# Patient Record
Sex: FEMALE | NOT HISPANIC OR LATINO | Employment: UNEMPLOYED | URBAN - METROPOLITAN AREA
[De-identification: names, ages, dates, MRNs, and addresses within clinical notes are randomized per-mention and may not be internally consistent; named-entity substitution may affect disease eponyms.]

---

## 2022-03-06 PROBLEM — Z01.419 WELL WOMAN EXAM: Status: ACTIVE | Noted: 2022-03-06

## 2022-03-10 ENCOUNTER — TELEPHONE (OUTPATIENT)
Dept: OBSTETRICS AND GYNECOLOGY | Facility: CLINIC | Age: 22
End: 2022-03-10

## 2022-03-10 ENCOUNTER — LAB (OUTPATIENT)
Dept: LAB | Facility: HOSPITAL | Age: 22
End: 2022-03-10

## 2022-03-10 ENCOUNTER — OFFICE VISIT (OUTPATIENT)
Dept: OBSTETRICS AND GYNECOLOGY | Facility: CLINIC | Age: 22
End: 2022-03-10

## 2022-03-10 VITALS
WEIGHT: 138.8 LBS | SYSTOLIC BLOOD PRESSURE: 100 MMHG | DIASTOLIC BLOOD PRESSURE: 60 MMHG | BODY MASS INDEX: 21.79 KG/M2 | HEIGHT: 67 IN

## 2022-03-10 DIAGNOSIS — Z11.8 SCREENING FOR CHLAMYDIAL DISEASE: ICD-10-CM

## 2022-03-10 DIAGNOSIS — N92.6 IRREGULAR MENSES: Primary | ICD-10-CM

## 2022-03-10 PROBLEM — F41.9 ANXIETY: Status: ACTIVE | Noted: 2020-01-01

## 2022-03-10 PROCEDURE — 87591 N.GONORRHOEAE DNA AMP PROB: CPT

## 2022-03-10 PROCEDURE — 99203 OFFICE O/P NEW LOW 30 MIN: CPT | Performed by: OBSTETRICS & GYNECOLOGY

## 2022-03-10 PROCEDURE — 87491 CHLMYD TRACH DNA AMP PROBE: CPT

## 2022-03-10 RX ORDER — BUSPIRONE HYDROCHLORIDE 5 MG/1
5 TABLET ORAL 2 TIMES DAILY
COMMUNITY
Start: 2022-02-24

## 2022-03-10 RX ORDER — ESTRADIOL 1 MG/1
TABLET ORAL
Qty: 21 TABLET | Refills: 0 | Status: SHIPPED | OUTPATIENT
Start: 2022-03-10

## 2022-03-10 RX ORDER — NORETHINDRONE AND ETHINYL ESTRADIOL 0.5-0.035
1 KIT ORAL DAILY
Qty: 28 TABLET | Refills: 12 | Status: SHIPPED | OUTPATIENT
Start: 2022-03-10 | End: 2023-03-10

## 2022-03-10 RX ORDER — LEVONORGESTREL AND ETHINYL ESTRADIOL 90; 20 UG/1; UG/1
TABLET ORAL
COMMUNITY
Start: 2022-02-04 | End: 2022-03-10 | Stop reason: SINTOL

## 2022-03-10 NOTE — TELEPHONE ENCOUNTER
Pharmacy calling they don't have the birth control called in - can they switch to another brand -mars call [phone] and advise

## 2022-03-10 NOTE — PROGRESS NOTES
"Subjective   Chief Complaint   Patient presents with   • Menstrual Problem     Heavy  periods and breakthrough bleeding with current birth control    • Contraception     Consult to possibly switch birth control         Chastity Bran is a 21 y.o. year old .  No LMP recorded.  She comes today to talk about issues with her cycles.  She is on birth control for about 3 years time now.  She initially was on a standard pill that gave her a cycle monthly.  Because she hated having her menses she was changed to an extended cycle pill.  She been having problems with bleeding for quite some time now.  She is red that stopping the pill periodically will allow the cycles to reset.  She is on multiple occasion stop the pill for 3 or 4 days at a time but it really has not solved her problem.    She is sexually active.  They are monogamous.  Condoms are not routinely used.    The following portions of the patient's history were reviewed and updated as appropriate:current medications, allergies, past family history, past medical history, past social history and past surgical history    Social History    Tobacco Use      Smoking status: Never Smoker      Smokeless tobacco: Never Used         Objective   /60   Ht 170.2 cm (67\")   Wt 63 kg (138 lb 12.8 oz)   BMI 21.74 kg/m²     Lab Review   No data reviewed    Imaging   No data reviewed        Assessment   1. Irregular menses most likely related to progestin predominance on birth control pills     Plan   1. Explained mechanism of action and why adding additional estrogen is helpful  2. Have encouraged her to check a home urine pregnancy test first before beginning the recommended treatment.  3. I think she was served better to get to a once a month predictable cycle for a while.  At that point if things have straightened out I think we can talk about ways to transition to an extended cycle.  4. The following tests were ordered today: STD testing (GC/CHL) by urine.  It " was explained to Chastity that all lab test should be back within the one week after they are performed. She will be notified about the results, regardless of the findings. If she has not been contacted by the office within 2 weeks after the test has been performed, it is her responsibility to contact us to learn about her results.  5. The use of condoms for STD prevention was emphasized.  It was explained to Chastity that condoms are not a full proof way to eliminate the chance of a sexually transmitted disease.  Ultimately knowing her partner's sexual history is most important.  All of her questions related to condom use were answered.  6. The importance of keeping all planned follow-up and taking all medications as prescribed was emphasized.  7. Follow up for recheck of Her cycle in 3 to 4 months time     New Medications Ordered This Visit   Medications   • norethindrone-ethinyl estradiol (Necon 1/35, 28,) 1-35 MG-MCG per tablet     Sig: Take 1 tablet by mouth Daily.     Dispense:  28 tablet     Refill:  4   • estradiol (Estrace) 1 MG tablet     Si pill daily day 1 through 7 monthly for 3 months     Dispense:  21 tablet     Refill:  0          This note was electronically signed.    Shun Caldwell M.D.  March 10, 2022    Total time spent today with Chastity  was 30-44 minutes (level 3) - pathophysiology of her presenting problem along with plans for any diagnositc work-up and treatment.    Part of this note may be an electronic transcription/translation of spoken language to printed text using the Dragon Dictation System.

## 2022-03-12 LAB
C TRACH RRNA SPEC QL NAA+PROBE: NEGATIVE
N GONORRHOEA RRNA SPEC QL NAA+PROBE: NEGATIVE

## 2024-05-03 ENCOUNTER — OFFICE VISIT (OUTPATIENT)
Dept: OBSTETRICS AND GYNECOLOGY | Facility: CLINIC | Age: 24
End: 2024-05-03
Payer: COMMERCIAL

## 2024-05-03 VITALS
WEIGHT: 144.6 LBS | BODY MASS INDEX: 22.7 KG/M2 | SYSTOLIC BLOOD PRESSURE: 110 MMHG | HEIGHT: 67 IN | DIASTOLIC BLOOD PRESSURE: 64 MMHG

## 2024-05-03 DIAGNOSIS — Z01.419 WELL WOMAN EXAM WITH ROUTINE GYNECOLOGICAL EXAM: Primary | ICD-10-CM

## 2024-05-03 RX ORDER — HYDROXYZINE PAMOATE 25 MG/1
25 CAPSULE ORAL
COMMUNITY
Start: 2024-04-02

## 2024-05-03 RX ORDER — LISDEXAMFETAMINE DIMESYLATE 30 MG/1
30 CAPSULE ORAL EVERY MORNING
COMMUNITY
Start: 2024-04-01

## 2024-05-03 NOTE — PROGRESS NOTES
Subjective   Chief Complaint   Patient presents with    Gynecologic Exam     Well woman exam     Chastity Bran is a 23 y.o. year old  presenting to be seen for her annual exam.   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   Today is her first pap. She is currently menstruating.  SEXUAL Hx:  She is not currently sexually active.  In the past year there there has been NO new sexual partners.    Condoms are not needed because she is not sexually active.  She would not like to be screened for STD's at today's exam.  Current birth control method: abstinence.  She is happy with her current method of contraception and does not want to discuss alternative methods of contraception. She is considering restarting ocps, she had continued breakthrough bleeding with previous pill, if she restarts pills she would like to try a different one.   MENSTRUAL Hx:  Patient's last menstrual period was 2024 (exact date).  In the past 6 months her cycles have been regular, predictable and occur monthly.  Her menstrual flow is typically normal.   Each month on average there are roughly 0 day(s) of very heavy flow.    Intermenstrual bleeding is absent.    Post-coital bleeding is n/a.  Dysmenorrhea: is not affecting her activities of daily living  PMS: is not affecting her activities of daily living  Her cycles are not a source of concern for her that she wishes to discuss today.  HEALTH Hx:  She exercises regularly: yes.  She wears her seat belt: yes.  She has concerns about domestic violence: no.  OTHER THINGS SHE WANTS TO DISCUSS TODAY:  Nothing else    The following portions of the patient's history were reviewed and updated as appropriate:problem list, current medications, allergies, past family history, past medical history, past social history, and past surgical history.    Social History    Tobacco Use      Smoking status: Never      Smokeless tobacco: Never    Review of  "Systems  Constitutional POS: nothing reported    NEG: anorexia or night sweats   Genitourinary POS: nothing reported    NEG: dysuria or hematuria      Gastointestinal POS: nothing reported    NEG: bloating, change in bowel habits, melena, or reflux symptoms   Integument POS: nothing reported    NEG: moles that are changing in size, shape, color or rashes   Breast POS: nothing reported    NEG: persistent breast lump, skin dimpling, or nipple discharge        Objective   /64 (BP Location: Left arm, Patient Position: Sitting, Cuff Size: Adult)   Ht 170.2 cm (67\")   Wt 65.6 kg (144 lb 9.6 oz)   LMP 05/01/2024 (Exact Date)   BMI 22.65 kg/m²     General:  well developed; well nourished  no acute distress   Skin:  No suspicious lesions seen   Thyroid: normal to inspection and palpation   Breasts:  Examined in supine position  Symmetric without masses or skin dimpling  Nipples normal without inversion, lesions or discharge  There are no palpable axillary nodes   Abdomen: soft, non-tender; no masses  no umbilical or inguinal hernias are present  no hepato-splenomegaly   Pelvis: Clinical staff was present for exam  :  urethral meatus normal;  Vaginal:  normal pink mucosa without prolapse or lesions.  Cervix:  normal appearance. Menstrual bleeding noted   Uterus:  normal size, shape and consistency.  Adnexa:  normal bimanual exam of the adnexa.  Rectal:  digital rectal exam not performed; anus visually normal appearing.        Assessment   Well woman with routine gynecological exam     Plan     Pap was done today.  If she does not receive the results of the Pap within 2 weeks  time, she was instructed to call to find out the results.  I explained to Chastity that the recommendations for Pap smear interval in a low risk patient's has lengthened to 3 years time.  I encouraged her to be seen yearly for a full physical exam including breast and pelvic exam even during the off years when PAP's will not be performed.Discussed " with Chastity as this is her first pap would recommend 3 year paps then can extend screening interval.  Discussed if she would like to restart ocps she should call office for prescription and schedule 3 month follow up  She has completed her HPV vaccine series   The importance of keeping all planned follow-up and taking all medications as prescribed was emphasized.  Today I discussed with Chastity the total recommended calcium intake for a premenopausal female is 1000 mg.  Ideally this should be from dietary sources.  I reviewed calcium content in various foods including milk, fortified orange juice and yogurt.  If she cannot get sufficient calcium through dietary means, it is recommended to supplement with either a multivitamin or calcium to reach her daily goal.  I also reviewed the difference in the bioavailability of calcium carbonate and calcium citrate containing supplements and the importance of taking calcium carbonate containing products with food.  Finally, vitamin D's role in calcium absorption was reviewed and a total daily vitamin D intake of 800 units was recommended.  Follow up for annual exam 1 year    No orders of the defined types were placed in this encounter.         This note was electronically signed.    Sanjuana Perkins, APRN  May 3, 2024

## 2024-05-07 LAB — REF LAB TEST METHOD: NORMAL

## 2024-07-08 ENCOUNTER — TELEPHONE (OUTPATIENT)
Dept: OBSTETRICS AND GYNECOLOGY | Facility: CLINIC | Age: 24
End: 2024-07-08
Payer: COMMERCIAL

## 2024-07-08 RX ORDER — NORGESTIMATE AND ETHINYL ESTRADIOL 0.25-0.035
1 KIT ORAL DAILY
Qty: 84 TABLET | Refills: 1 | Status: SHIPPED | OUTPATIENT
Start: 2024-07-08

## 2024-07-08 NOTE — TELEPHONE ENCOUNTER
Pt informed and stated understanding.      Per MESHA Truong:    Sprintec sent in, please have her schedule for 3 month follow up.  MESHA Bowman

## 2024-07-08 NOTE — TELEPHONE ENCOUNTER
RODOLFO    Pt called and was wanting to be put on the birth control pill again. Pt spoke with Sanjuana at her last appointment about this. Please advise

## 2024-10-11 ENCOUNTER — OFFICE VISIT (OUTPATIENT)
Dept: OBSTETRICS AND GYNECOLOGY | Facility: CLINIC | Age: 24
End: 2024-10-11
Payer: COMMERCIAL

## 2024-10-11 VITALS
WEIGHT: 139 LBS | SYSTOLIC BLOOD PRESSURE: 110 MMHG | DIASTOLIC BLOOD PRESSURE: 70 MMHG | BODY MASS INDEX: 21.82 KG/M2 | HEIGHT: 67 IN

## 2024-10-11 DIAGNOSIS — N39.0 URINARY TRACT INFECTION WITHOUT HEMATURIA, SITE UNSPECIFIED: ICD-10-CM

## 2024-10-11 DIAGNOSIS — N89.8 VAGINAL ITCHING: Primary | ICD-10-CM

## 2024-10-11 LAB
BACTERIA UR QL AUTO: NORMAL /HPF
BILIRUB UR QL STRIP: NEGATIVE
CLARITY UR: CLEAR
COLOR UR: ABNORMAL
GLUCOSE UR STRIP-MCNC: NEGATIVE MG/DL
HGB UR QL STRIP.AUTO: NEGATIVE
HYALINE CASTS UR QL AUTO: NORMAL /LPF
KETONES UR QL STRIP: NEGATIVE
LEUKOCYTE ESTERASE UR QL STRIP.AUTO: NEGATIVE
NITRITE UR QL STRIP: POSITIVE
PH UR STRIP.AUTO: 7.5 [PH] (ref 5–8)
PROT UR QL STRIP: NEGATIVE
RBC # UR STRIP: NORMAL /HPF
REF LAB TEST METHOD: NORMAL
SP GR UR STRIP: 1.02 (ref 1–1.03)
SQUAMOUS #/AREA URNS HPF: NORMAL /HPF
UROBILINOGEN UR QL STRIP: ABNORMAL
WBC # UR STRIP: NORMAL /HPF

## 2024-10-11 PROCEDURE — 87086 URINE CULTURE/COLONY COUNT: CPT | Performed by: NURSE PRACTITIONER

## 2024-10-11 PROCEDURE — 81001 URINALYSIS AUTO W/SCOPE: CPT | Performed by: NURSE PRACTITIONER

## 2024-10-11 RX ORDER — DOXYCYCLINE 100 MG/1
CAPSULE ORAL
COMMUNITY
Start: 2024-07-31 | End: 2024-10-11

## 2024-10-11 RX ORDER — NORGESTIMATE AND ETHINYL ESTRADIOL 0.25-0.035
1 KIT ORAL DAILY
Qty: 84 TABLET | Refills: 3 | Status: SHIPPED | OUTPATIENT
Start: 2024-10-11

## 2024-10-11 RX ORDER — CLINDAMYCIN PHOSPHATE 11.9 MG/ML
SOLUTION TOPICAL
COMMUNITY
Start: 2024-07-31

## 2024-10-11 RX ORDER — ONDANSETRON 4 MG/1
4 TABLET, FILM COATED ORAL
COMMUNITY
Start: 2024-09-10

## 2024-10-11 RX ORDER — SPIRONOLACTONE 100 MG/1
TABLET, FILM COATED ORAL
COMMUNITY
Start: 2024-09-08 | End: 2024-10-11

## 2024-10-11 RX ORDER — TRETINOIN 0.25 MG/G
CREAM TOPICAL
COMMUNITY
Start: 2024-08-12

## 2024-10-11 NOTE — PROGRESS NOTES
"Subjective   Chief Complaint   Patient presents with    Follow-up     OCP med check / possible yeast infection & UTI     Chastity Bran is a 23 y.o. year old .  Patient's last menstrual period was 10/01/2024 (exact date).  She presents to be seen for OCP follow-up.  She was started on Sprintec after her last visit.  She will is happy with her Sprintec and would like to continue use.  She denies any bothersome side effects.  She reports regular menstrual cycles.  She also stopped her spironolactone which was prescribed by her dermatologist because she noticed she had hair loss with medication.  She is wondering if there would be a better birth control for her to help with acne.  She just recently stopped her spironolactone about 2 weeks ago she has not noticed any acne since that time.  She also has concerns for possible yeast infection or UTI today. She was on long term doxycycline therapy prescribed by her dermatologist for acne. She stopped use as she was concerned about long term antibiotic use. She had atheletes foot during her doxcycline use and was concerned about potential yeast. She feels some vaginal irritation and dysuria. She started azo yesterday. She has increased her water intake as well. She is currently sexually active, not concerned for sti but welcomes screening.     The following portions of the patient's history were reviewed and updated as appropriate:current medications and allergies    Social History    Tobacco Use      Smoking status: Never      Smokeless tobacco: Never         Objective   /70 (BP Location: Right arm, Patient Position: Sitting, Cuff Size: Adult)   Ht 170.2 cm (67\")   Wt 63 kg (139 lb)   LMP 10/01/2024 (Exact Date)   BMI 21.77 kg/m²   Pelvic exam: VULVA: normal appearing vulva with no masses, tenderness or lesions, mild erythema VAGINA: normal appearing vagina with normal color and discharge, no lesions, vaginal edema mild erythema, CERVIX: normal appearing " cervix without discharge or lesions.    Lab Review   No data reviewed    Imaging   No data reviewed        Assessment   Contraceptive management- doing well with sprintec  Vaginal irritation  Dysuria.     Plan   1 swab collected for vaginitis panel and STI screening.  Urine sent for urinalysis  Will await swab and urine results prior to changing treatment.  Encouraged her to continue to increase her water intake use over-the-counter cranberry supplement.  Discussed use elective axillas containing probiotic and boric acid vaginal suppositories.  Refill for Sprintec sent to pharmacy on file.  Discussed Sprintec is a good OCP for acne control as well.  The importance of keeping all planned follow-up and taking all medications as prescribed was emphasized.  Rtc for annual exam as schedule in may or prn      No orders of the defined types were placed in this encounter.         This note was electronically signed.    Sanjuana Perkins, APRN  October 11, 2024

## 2024-10-12 LAB — HOLD SPECIMEN: NORMAL

## 2024-10-13 LAB — BACTERIA SPEC AEROBE CULT: NO GROWTH

## 2024-10-21 RX ORDER — METRONIDAZOLE 500 MG/1
500 TABLET ORAL 2 TIMES DAILY
Qty: 14 TABLET | Refills: 0 | Status: SHIPPED | OUTPATIENT
Start: 2024-10-21 | End: 2024-10-28

## 2024-11-20 RX ORDER — DROSPIRENONE AND ETHINYL ESTRADIOL 0.02-3(28)
1 KIT ORAL DAILY
Qty: 84 TABLET | Refills: 3 | Status: SHIPPED | OUTPATIENT
Start: 2024-11-20 | End: 2025-11-20

## 2024-12-23 RX ORDER — NORGESTIMATE AND ETHINYL ESTRADIOL 0.25-0.035
1 KIT ORAL DAILY
Qty: 84 TABLET | Refills: 1 | OUTPATIENT
Start: 2024-12-23

## 2025-05-12 NOTE — PROGRESS NOTES
Subjective   Chief Complaint   Patient presents with    Annual Exam     Well woman exam     Chastity Bran is a 24 y.o. year old  presenting to be seen for her annual exam.   Her last pap was 2024 with normal cytology.   She is interested in stopping her OCPs as she has had increased anxiety and depression and she is wondering if her OCPs might be related to this.  She does admit to being very stressed with school.  She is done with her semester but is planning on doing her research project over the summer.  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  She noticed that she has been more emotional and tearful. She does admit to being pretty isolated over the last 6 months as she has been studying so much.  Denies any HI or SI.  She has noticed decreased libido and some vaginal dryness.   She does admit this may be somewhat related to her stress levels as well.   SEXUAL Hx:  She is currently sexually active.  In the past year there there has been NO new sexual partners.    Condoms are never used.  She would like to be screened for STD's at today's exam.  Current birth control method: OCP (estrogen/progesterone).  She is happy with her current method of contraception and does not want to discuss alternative methods of contraception.  MENSTRUAL Hx:  Patient's last menstrual period was 2025 (approximate).  In the past 6 months her cycles have been regular, predictable and occur monthly.  Her menstrual flow is typically normal.   Each month on average there are roughly 0 day(s) of very heavy flow.    Intermenstrual bleeding is absent.    Post-coital bleeding is absent.  Dysmenorrhea: is not affecting her activities of daily living  PMS: is not affecting her activities of daily living  Her cycles are not a source of concern for her that she wishes to discuss today.  HEALTH Hx:  She exercises regularly: yes.  She wears her seat belt: yes.  She has concerns about domestic  "violence: no.  OTHER THINGS SHE WANTS TO DISCUSS TODAY:  Nothing else    The following portions of the patient's history were reviewed and updated as appropriate:problem list, current medications, allergies, past family history, past medical history, past social history, and past surgical history.    Social History    Tobacco Use      Smoking status: Never      Smokeless tobacco: Never    Review of Systems  Constitutional POS: nothing reported    NEG: anorexia or night sweats   Genitourinary POS: nothing reported    NEG: dysuria or hematuria      Gastointestinal POS: nothing reported    NEG: bloating, change in bowel habits, melena, or reflux symptoms   Integument POS: nothing reported    NEG: moles that are changing in size, shape, color or rashes   Breast POS: nothing reported    NEG: persistent breast lump, skin dimpling, or nipple discharge        Objective   /70 (BP Location: Right arm, Patient Position: Sitting, Cuff Size: Adult)   Ht 170.2 cm (67\")   Wt 60.2 kg (132 lb 12.8 oz)   LMP 04/14/2025 (Approximate)   BMI 20.80 kg/m²     General:  well developed; well nourished  no acute distress  mentation appropriate   Skin:  No suspicious lesions seen   Thyroid: normal to inspection and palpation   Breasts:  Examined in supine position  Symmetric without masses or skin dimpling  Nipples normal without inversion, lesions or discharge  There are no palpable axillary nodes   Abdomen: soft, non-tender; no masses  no umbilical or inguinal hernias are present  no hepato-splenomegaly   Pelvis: Clinical staff was present for exam  :  urethral meatus normal; urethra normal:  Vaginal:  normal pink mucosa without prolapse or lesions.  Cervix:  normal appearance.  Uterus:  normal size, shape and consistency.  Adnexa:  normal bimanual exam of the adnexa.  Rectal:  digital rectal exam not performed; anus visually normal appearing.        Assessment   Well woman with routine gynecological " "exam  Anxiety/depression  Decreased libido      Plan     Pap and STD testing was done today.  If she does not receive the results of the Pap within 2 weeks  time, she was instructed to call to find out the results.  I explained to Chastity that the recommendations for Pap smear interval in a low risk patient's has lengthened to 3 years time.  I encouraged her to be seen yearly for a full physical exam including breast and pelvic exam even during the off years when PAP's will not be performed.  Std blood work ordered   Prescription(s) for OCP's were refilled today she is planning to trial off OCPs to see if this helps her moods.  She is planning condoms and withdrawal method in the interval.  Also discussed cycle tracking.  Discussed if she does resume OCPs to not rely on OCPs for contraception for the first pill pack.  If she does not see an improvement in her mood over the next 4 to 6 weeks after stopping OCPs discussed then that her changes are most likely nonhormonal and would recommend restarting OCPs.  She knows to reach out to provider at any time to discuss moods or contraceptive options.  Discussed her Vyvanse may be contributing some to her vaginal dryness.  She is \"planning to not take it for the summer since she is not in school and she will monitor symptoms to see if this is affecting her vaginal dryness as well.  The importance of keeping all planned follow-up and taking all medications as prescribed was emphasized.  Today I discussed with Chastity the total recommended calcium intake for a premenopausal female is 1000 mg.  Ideally this should be from dietary sources.  I reviewed calcium content in various foods including milk, fortified orange juice and yogurt.  If she cannot get sufficient calcium through dietary means, it is recommended to supplement with either a multivitamin or calcium to reach her daily goal.  I also reviewed the difference in the bioavailability of calcium carbonate and calcium citrate " containing supplements and the importance of taking calcium carbonate containing products with food.  Finally, vitamin D's role in calcium absorption was reviewed and a total daily vitamin D intake of 800 units was recommended.  She has completed her hpv vaccine series   Follow up for annual exam 1 year    New Medications Ordered This Visit   Medications    drospirenone-ethinyl estradiol (JOIE) 3-0.02 MG per tablet     Sig: Take 1 tablet by mouth Daily.     Dispense:  84 tablet     Refill:  3          This note was electronically signed.    Sanjuana Perkins, APRN  May 14, 2025

## 2025-05-14 ENCOUNTER — OFFICE VISIT (OUTPATIENT)
Dept: OBSTETRICS AND GYNECOLOGY | Facility: CLINIC | Age: 25
End: 2025-05-14
Payer: COMMERCIAL

## 2025-05-14 VITALS
WEIGHT: 132.8 LBS | SYSTOLIC BLOOD PRESSURE: 110 MMHG | DIASTOLIC BLOOD PRESSURE: 70 MMHG | HEIGHT: 67 IN | BODY MASS INDEX: 20.84 KG/M2

## 2025-05-14 DIAGNOSIS — Z11.3 SCREENING EXAMINATION FOR STD (SEXUALLY TRANSMITTED DISEASE): ICD-10-CM

## 2025-05-14 DIAGNOSIS — Z30.41 ENCOUNTER FOR SURVEILLANCE OF CONTRACEPTIVE PILLS: ICD-10-CM

## 2025-05-14 DIAGNOSIS — Z01.419 WELL WOMAN EXAM WITH ROUTINE GYNECOLOGICAL EXAM: Primary | ICD-10-CM

## 2025-05-14 RX ORDER — DROSPIRENONE AND ETHINYL ESTRADIOL 0.02-3(28)
1 KIT ORAL DAILY
Qty: 84 TABLET | Refills: 3 | Status: SHIPPED | OUTPATIENT
Start: 2025-05-14 | End: 2026-05-14

## 2025-05-15 LAB — REF LAB TEST METHOD: NORMAL
